# Patient Record
Sex: FEMALE | Race: WHITE | Employment: FULL TIME | URBAN - METROPOLITAN AREA
[De-identification: names, ages, dates, MRNs, and addresses within clinical notes are randomized per-mention and may not be internally consistent; named-entity substitution may affect disease eponyms.]

---

## 2020-03-04 ENCOUNTER — APPOINTMENT (OUTPATIENT)
Dept: MRI IMAGING | Facility: CLINIC | Age: 45
End: 2020-03-04
Attending: HOSPITALIST
Payer: COMMERCIAL

## 2020-03-04 ENCOUNTER — APPOINTMENT (OUTPATIENT)
Dept: CT IMAGING | Facility: CLINIC | Age: 45
End: 2020-03-04
Attending: EMERGENCY MEDICINE
Payer: COMMERCIAL

## 2020-03-04 ENCOUNTER — HOSPITAL ENCOUNTER (OUTPATIENT)
Facility: CLINIC | Age: 45
Discharge: HOME | End: 2020-03-05
Attending: EMERGENCY MEDICINE | Admitting: HOSPITALIST
Payer: COMMERCIAL

## 2020-03-04 DIAGNOSIS — I63.9 ACUTE CVA (CEREBROVASCULAR ACCIDENT) (H): ICD-10-CM

## 2020-03-04 LAB
ALBUMIN SERPL-MCNC: 4 G/DL (ref 3.4–5)
ALP SERPL-CCNC: 59 U/L (ref 40–150)
ALT SERPL W P-5'-P-CCNC: 17 U/L (ref 0–50)
ANION GAP SERPL CALCULATED.3IONS-SCNC: 6 MMOL/L (ref 3–14)
APTT PPP: 26 SEC (ref 22–37)
AST SERPL W P-5'-P-CCNC: 13 U/L (ref 0–45)
BASOPHILS # BLD AUTO: 0 10E9/L (ref 0–0.2)
BASOPHILS NFR BLD AUTO: 0.2 %
BILIRUB DIRECT SERPL-MCNC: 0.2 MG/DL (ref 0–0.2)
BILIRUB SERPL-MCNC: 1.1 MG/DL (ref 0.2–1.3)
BUN SERPL-MCNC: 6 MG/DL (ref 7–30)
CALCIUM SERPL-MCNC: 9.2 MG/DL (ref 8.5–10.1)
CHLORIDE SERPL-SCNC: 111 MMOL/L (ref 94–109)
CO2 SERPL-SCNC: 23 MMOL/L (ref 20–32)
CREAT SERPL-MCNC: 0.7 MG/DL (ref 0.52–1.04)
DIFFERENTIAL METHOD BLD: NORMAL
EOSINOPHIL # BLD AUTO: 0.1 10E9/L (ref 0–0.7)
EOSINOPHIL NFR BLD AUTO: 1.4 %
ERYTHROCYTE [DISTWIDTH] IN BLOOD BY AUTOMATED COUNT: 13 % (ref 10–15)
GFR SERPL CREATININE-BSD FRML MDRD: >90 ML/MIN/{1.73_M2}
GLUCOSE SERPL-MCNC: 107 MG/DL (ref 70–99)
HBA1C MFR BLD: 5.2 % (ref 0–5.6)
HCT VFR BLD AUTO: 40.2 % (ref 35–47)
HGB BLD-MCNC: 13.4 G/DL (ref 11.7–15.7)
IMM GRANULOCYTES # BLD: 0 10E9/L (ref 0–0.4)
IMM GRANULOCYTES NFR BLD: 0.2 %
INR PPP: 1.05 (ref 0.86–1.14)
INTERPRETATION ECG - MUSE: NORMAL
LYMPHOCYTES # BLD AUTO: 3.8 10E9/L (ref 0.8–5.3)
LYMPHOCYTES NFR BLD AUTO: 40.8 %
MCH RBC QN AUTO: 32 PG (ref 26.5–33)
MCHC RBC AUTO-ENTMCNC: 33.3 G/DL (ref 31.5–36.5)
MCV RBC AUTO: 96 FL (ref 78–100)
MONOCYTES # BLD AUTO: 0.5 10E9/L (ref 0–1.3)
MONOCYTES NFR BLD AUTO: 5.4 %
NEUTROPHILS # BLD AUTO: 4.9 10E9/L (ref 1.6–8.3)
NEUTROPHILS NFR BLD AUTO: 52 %
NRBC # BLD AUTO: 0 10*3/UL
NRBC BLD AUTO-RTO: 0 /100
PLATELET # BLD AUTO: 237 10E9/L (ref 150–450)
POTASSIUM SERPL-SCNC: 3.4 MMOL/L (ref 3.4–5.3)
PROT SERPL-MCNC: 7.4 G/DL (ref 6.8–8.8)
RBC # BLD AUTO: 4.19 10E12/L (ref 3.8–5.2)
SODIUM SERPL-SCNC: 140 MMOL/L (ref 133–144)
TROPONIN I SERPL-MCNC: <0.015 UG/L (ref 0–0.04)
WBC # BLD AUTO: 9.4 10E9/L (ref 4–11)

## 2020-03-04 PROCEDURE — 99223 1ST HOSP IP/OBS HIGH 75: CPT | Mod: AI | Performed by: HOSPITALIST

## 2020-03-04 PROCEDURE — 84484 ASSAY OF TROPONIN QUANT: CPT | Performed by: HOSPITALIST

## 2020-03-04 PROCEDURE — 70450 CT HEAD/BRAIN W/O DYE: CPT

## 2020-03-04 PROCEDURE — 83036 HEMOGLOBIN GLYCOSYLATED A1C: CPT | Performed by: EMERGENCY MEDICINE

## 2020-03-04 PROCEDURE — 85610 PROTHROMBIN TIME: CPT | Performed by: EMERGENCY MEDICINE

## 2020-03-04 PROCEDURE — 93005 ELECTROCARDIOGRAM TRACING: CPT

## 2020-03-04 PROCEDURE — 0042T CT HEAD PERFUSION WITH CONTRAST: CPT

## 2020-03-04 PROCEDURE — 25000132 ZZH RX MED GY IP 250 OP 250 PS 637: Performed by: HOSPITALIST

## 2020-03-04 PROCEDURE — 99291 CRITICAL CARE FIRST HOUR: CPT | Performed by: NURSE PRACTITIONER

## 2020-03-04 PROCEDURE — A9585 GADOBUTROL INJECTION: HCPCS | Performed by: HOSPITALIST

## 2020-03-04 PROCEDURE — 25000125 ZZHC RX 250: Performed by: EMERGENCY MEDICINE

## 2020-03-04 PROCEDURE — 25800030 ZZH RX IP 258 OP 636: Performed by: HOSPITALIST

## 2020-03-04 PROCEDURE — 84484 ASSAY OF TROPONIN QUANT: CPT | Performed by: EMERGENCY MEDICINE

## 2020-03-04 PROCEDURE — 70553 MRI BRAIN STEM W/O & W/DYE: CPT

## 2020-03-04 PROCEDURE — 85025 COMPLETE CBC W/AUTO DIFF WBC: CPT | Performed by: EMERGENCY MEDICINE

## 2020-03-04 PROCEDURE — 70498 CT ANGIOGRAPHY NECK: CPT

## 2020-03-04 PROCEDURE — 25000132 ZZH RX MED GY IP 250 OP 250 PS 637: Performed by: EMERGENCY MEDICINE

## 2020-03-04 PROCEDURE — 99285 EMERGENCY DEPT VISIT HI MDM: CPT | Mod: 25

## 2020-03-04 PROCEDURE — 85730 THROMBOPLASTIN TIME PARTIAL: CPT | Performed by: EMERGENCY MEDICINE

## 2020-03-04 PROCEDURE — 25500064 ZZH RX 255 OP 636: Performed by: HOSPITALIST

## 2020-03-04 PROCEDURE — 12000000 ZZH R&B MED SURG/OB

## 2020-03-04 PROCEDURE — 80076 HEPATIC FUNCTION PANEL: CPT | Performed by: EMERGENCY MEDICINE

## 2020-03-04 PROCEDURE — 80048 BASIC METABOLIC PNL TOTAL CA: CPT | Performed by: EMERGENCY MEDICINE

## 2020-03-04 PROCEDURE — 36415 COLL VENOUS BLD VENIPUNCTURE: CPT | Performed by: HOSPITALIST

## 2020-03-04 PROCEDURE — 25000128 H RX IP 250 OP 636: Performed by: EMERGENCY MEDICINE

## 2020-03-04 RX ORDER — ASPIRIN 325 MG
325 TABLET ORAL ONCE
Status: COMPLETED | OUTPATIENT
Start: 2020-03-04 | End: 2020-03-04

## 2020-03-04 RX ORDER — PROCHLORPERAZINE MALEATE 10 MG
10 TABLET ORAL EVERY 6 HOURS PRN
Status: DISCONTINUED | OUTPATIENT
Start: 2020-03-04 | End: 2020-03-05 | Stop reason: HOSPADM

## 2020-03-04 RX ORDER — NALOXONE HYDROCHLORIDE 0.4 MG/ML
.1-.4 INJECTION, SOLUTION INTRAMUSCULAR; INTRAVENOUS; SUBCUTANEOUS
Status: DISCONTINUED | OUTPATIENT
Start: 2020-03-04 | End: 2020-03-05 | Stop reason: HOSPADM

## 2020-03-04 RX ORDER — IOPAMIDOL 755 MG/ML
120 INJECTION, SOLUTION INTRAVASCULAR ONCE
Status: COMPLETED | OUTPATIENT
Start: 2020-03-04 | End: 2020-03-04

## 2020-03-04 RX ORDER — ACETAMINOPHEN 325 MG/1
650 TABLET ORAL EVERY 4 HOURS PRN
Status: DISCONTINUED | OUTPATIENT
Start: 2020-03-04 | End: 2020-03-05 | Stop reason: HOSPADM

## 2020-03-04 RX ORDER — ONDANSETRON 4 MG/1
4 TABLET, ORALLY DISINTEGRATING ORAL EVERY 6 HOURS PRN
Status: DISCONTINUED | OUTPATIENT
Start: 2020-03-04 | End: 2020-03-05 | Stop reason: HOSPADM

## 2020-03-04 RX ORDER — CLOPIDOGREL BISULFATE 75 MG/1
75 TABLET ORAL DAILY
Status: DISCONTINUED | OUTPATIENT
Start: 2020-03-05 | End: 2020-03-05 | Stop reason: HOSPADM

## 2020-03-04 RX ORDER — SODIUM CHLORIDE 9 MG/ML
INJECTION, SOLUTION INTRAVENOUS CONTINUOUS
Status: DISCONTINUED | OUTPATIENT
Start: 2020-03-04 | End: 2020-03-05 | Stop reason: HOSPADM

## 2020-03-04 RX ORDER — ONDANSETRON 2 MG/ML
4 INJECTION INTRAMUSCULAR; INTRAVENOUS EVERY 6 HOURS PRN
Status: DISCONTINUED | OUTPATIENT
Start: 2020-03-04 | End: 2020-03-05 | Stop reason: HOSPADM

## 2020-03-04 RX ORDER — AMOXICILLIN 250 MG
2 CAPSULE ORAL 2 TIMES DAILY PRN
Status: DISCONTINUED | OUTPATIENT
Start: 2020-03-04 | End: 2020-03-05 | Stop reason: HOSPADM

## 2020-03-04 RX ORDER — LIDOCAINE 40 MG/G
CREAM TOPICAL
Status: DISCONTINUED | OUTPATIENT
Start: 2020-03-04 | End: 2020-03-05 | Stop reason: HOSPADM

## 2020-03-04 RX ORDER — CLOPIDOGREL 300 MG/1
300 TABLET, FILM COATED ORAL ONCE
Status: COMPLETED | OUTPATIENT
Start: 2020-03-04 | End: 2020-03-04

## 2020-03-04 RX ORDER — ASPIRIN 300 MG/1
300 SUPPOSITORY RECTAL DAILY
Status: DISCONTINUED | OUTPATIENT
Start: 2020-03-05 | End: 2020-03-05

## 2020-03-04 RX ORDER — PROCHLORPERAZINE 25 MG
25 SUPPOSITORY, RECTAL RECTAL EVERY 12 HOURS PRN
Status: DISCONTINUED | OUTPATIENT
Start: 2020-03-04 | End: 2020-03-05 | Stop reason: HOSPADM

## 2020-03-04 RX ORDER — AMOXICILLIN 250 MG
1 CAPSULE ORAL 2 TIMES DAILY PRN
Status: DISCONTINUED | OUTPATIENT
Start: 2020-03-04 | End: 2020-03-05 | Stop reason: HOSPADM

## 2020-03-04 RX ORDER — GADOBUTROL 604.72 MG/ML
8 INJECTION INTRAVENOUS ONCE
Status: COMPLETED | OUTPATIENT
Start: 2020-03-04 | End: 2020-03-04

## 2020-03-04 RX ORDER — POLYETHYLENE GLYCOL 3350 17 G/17G
17 POWDER, FOR SOLUTION ORAL DAILY PRN
Status: DISCONTINUED | OUTPATIENT
Start: 2020-03-04 | End: 2020-03-05 | Stop reason: HOSPADM

## 2020-03-04 RX ORDER — ACETAMINOPHEN 650 MG/1
650 SUPPOSITORY RECTAL EVERY 4 HOURS PRN
Status: DISCONTINUED | OUTPATIENT
Start: 2020-03-04 | End: 2020-03-05 | Stop reason: HOSPADM

## 2020-03-04 RX ORDER — ATORVASTATIN CALCIUM 40 MG/1
40 TABLET, FILM COATED ORAL
Status: DISCONTINUED | OUTPATIENT
Start: 2020-03-04 | End: 2020-03-05 | Stop reason: HOSPADM

## 2020-03-04 RX ORDER — HYDRALAZINE HYDROCHLORIDE 20 MG/ML
10-20 INJECTION INTRAMUSCULAR; INTRAVENOUS
Status: DISCONTINUED | OUTPATIENT
Start: 2020-03-04 | End: 2020-03-05 | Stop reason: HOSPADM

## 2020-03-04 RX ORDER — LABETALOL HYDROCHLORIDE 5 MG/ML
10-40 INJECTION, SOLUTION INTRAVENOUS EVERY 10 MIN PRN
Status: DISCONTINUED | OUTPATIENT
Start: 2020-03-04 | End: 2020-03-05 | Stop reason: HOSPADM

## 2020-03-04 RX ADMIN — IOPAMIDOL 120 ML: 755 INJECTION, SOLUTION INTRAVENOUS at 11:49

## 2020-03-04 RX ADMIN — ATORVASTATIN CALCIUM 40 MG: 40 TABLET, FILM COATED ORAL at 22:42

## 2020-03-04 RX ADMIN — SODIUM CHLORIDE: 9 INJECTION, SOLUTION INTRAVENOUS at 20:18

## 2020-03-04 RX ADMIN — GADOBUTROL 8 ML: 604.72 INJECTION INTRAVENOUS at 21:03

## 2020-03-04 RX ADMIN — SODIUM CHLORIDE 100 ML: 9 INJECTION, SOLUTION INTRAVENOUS at 11:50

## 2020-03-04 RX ADMIN — CLOPIDOGREL BISULFATE 300 MG: 300 TABLET, FILM COATED ORAL at 12:43

## 2020-03-04 RX ADMIN — SODIUM CHLORIDE: 9 INJECTION, SOLUTION INTRAVENOUS at 14:07

## 2020-03-04 RX ADMIN — ACETAMINOPHEN 650 MG: 325 TABLET, FILM COATED ORAL at 22:40

## 2020-03-04 RX ADMIN — ASPIRIN 325 MG ORAL TABLET 325 MG: 325 PILL ORAL at 12:43

## 2020-03-04 ASSESSMENT — ENCOUNTER SYMPTOMS
SPEECH DIFFICULTY: 1
CONFUSION: 1
WEAKNESS: 1
SHORTNESS OF BREATH: 0
NECK PAIN: 1
FACIAL ASYMMETRY: 1
FATIGUE: 1
HEADACHES: 1
NUMBNESS: 1

## 2020-03-04 ASSESSMENT — ACTIVITIES OF DAILY LIVING (ADL)
ADLS_ACUITY_SCORE: 15
ADLS_ACUITY_SCORE: 16

## 2020-03-04 NOTE — H&P
[facility]    History and Physical - Hospitalist Service       Date of Admission:  3/4/2020    Assessment & Plan   America Hartley is a 44 year old female admitted on 3/4/2020.  Past medical history of nephrolithiasis and tobacco abuse who presents with dysarthria, left facial droop and left upper and lower extremity numbness.    Possible acute CVA  Presents with acute onset dysarthria, left facial droop and left upper and lower extremity numbness.  Admission CT/CTA/CTP all negative for acute pathology.  EKG without ischemic changes, troponin negative and she is without chest pain complaints.  Denies any history of migraine and was without headache currently.  BMP and CBC also unremarkable.  Symptoms improved by time of arrival to ED but reports waxing/waning LUE and LLE numbness.  She was evaluated by neurology in the emergency room who recommended MRI of the brain and loading with aspirin and Plavix.  We will continue aspirin and Plavix daily, obtain MRI as noted, obtain echocardiogram, serial troponin, fasting lipid panel, hemoglobin A1c.  Will monitor on telemetry.  Will ask SLP, PT and OT to evaluate.    Tobacco abuse  Smokes 1/2 pack/day and has done so for the past 30 years.  She declines any kidney patch.       Diet: Regular diet once cleared for oral  DVT Prophylaxis: Pneumatic Compression Devices  Morrell Catheter: not present  Code Status: Full code per patient    Disposition Plan   Expected discharge: 2 - 3 days, recommended to prior living arrangement once work-up complete.  Entered: Walt Ayala MD 03/04/2020, 1:14 PM     The patient's care was discussed with the Patient and Patient's Family.    Walt Ayala MD  [facility]    ______________________________________________________________________    Chief Complaint   Dysarthria, left-sided numbness    History is obtained from the patient, chart review, discussion with emergency room provider in addition to discussion with  neurology team    History of Present Illness   America Hartley is a 44 year old female who presents with acute onset dysarthria and left upper and lower extremity numbness approximately 10:30 AM this morning.  She reports she was at work when she suddenly started to feel abnormal and noted that her left arm was numb.  She attempted to notify some of her colleagues, but she had very garbled speech which was unintelligible.  She then had onset of left lower extremity numbness as well.  She reports in the ambulance she had left-sided posterior neck pain but this resolved by time of arrival to the emergency room.  She reports her symptoms started to improve by time of arrival as well.  Her coworkers told her that she did have a left facial droop.  She denies any prior history of similar symptoms.  Denies any history of migraine headache and denies any current headache.  She reports her symptoms have nearly resolved but has ongoing waxing and waning of numbness in the left arm.  She otherwise only reports generalized fatigue.  She denies any chest pain or pressure, palpitations or history of palpitations.  She denies any shortness of breath, diaphoresis, nausea.  She reports having flulike symptoms approximately 3 to 4 weeks ago but has otherwise been in her usual state of health.  She denies any history of hypertension, hyperlipidemia, diabetes denies any personal or family history of coronary disease or clots.  Remainder of review of systems otherwise negative.    Review of Systems    The 10 point Review of Systems is negative other than noted in the HPI or here.     Past Medical History    Nephrolithiasis  Diverticulosis  Tobacco dependence    Past Surgical History   Appendectomy  Cholecystectomy    Social History   She has a 15-pack-year history of tobacco abuse, currently smoking 1/2 pack/day.  She reports approximately 2 alcoholic beverages per week.  Denies any illicit drug use.  Resides at home with her  .    Family History   Denies any family history of coronary disease or stroke, no family history of clotting disorder.    Prior to Admission Medications   Prior to Admission Medications   Prescriptions Last Dose Informant Patient Reported? Taking?   methylPREDNISolone Acetate (DEPO-MEDROL IJ) 1/27/2020  Yes Yes   Sig: Inject as directed every 3 months      Facility-Administered Medications: None     Allergies   No Known Allergies    Physical Exam   Vital Signs: Temp: 98.4  F (36.9  C) Temp src: Oral BP: 124/80 Pulse: 72 Heart Rate: 76 Resp: 18 SpO2: 98 %      Weight: 185 lbs 6.51 oz    General Appearance: Well-nourished female in no acute distress  Eyes: Pupils equal, round and reactive to light, sclera anicteric  HEENT: Mucous membranes moist, no neck lymphadenopathy  Respiratory: Lungs clear to auscultation bilaterally, no wheeze or crackles, no tachypnea  Cardiovascular: Regular in rhythm, normal S1/S2, no murmurs, rubs or gallops  GI: Abdomen soft, nontender, nondistended, normal bowel sounds  Lymph/Hematologic: No peripheral edema  Musculoskeletal: Extremities warm well perfused  Neurologic: Cranial nerves II through XII intact, 5 of 5 strength in all extremities, sensation to light touch intact, no pronator drift, coordination intact, oriented x3  Psychiatric: Normal affect    Data   Data reviewed today: I reviewed all medications, new labs and imaging results over the last 24 hours. I personally reviewed the EKG tracing showing Normal sinus rhythm without ischemic changes.    Recent Labs   Lab 03/04/20  1136   WBC 9.4   HGB 13.4   MCV 96      INR 1.05      POTASSIUM 3.4   CHLORIDE 111*   CO2 23   BUN 6*   CR 0.70   ANIONGAP 6   KAILEE 9.2   *   TROPI <0.015     Recent Results (from the past 24 hour(s))   CT Head w/o Contrast    Narrative    CT SCAN OF THE HEAD WITHOUT CONTRAST   3/4/2020 11:47 AM     HISTORY: Code stroke.    TECHNIQUE: Axial images of the head and coronal reformations  without  IV contrast material. Radiation dose for this scan was reduced using  automated exposure control, adjustment of the mA and/or kV according  to patient size, or iterative reconstruction technique.    COMPARISON: None.    FINDINGS: Mild volume loss is present. White matter hypoattenuation  likely represents mild chronic small vessel ischemic change. The  cerebral hemispheres, brainstem, and cerebellum otherwise demonstrate  normal morphology and attenuation. No evidence of acute ischemia,  hemorrhage, mass, mass effect or hydrocephalus. The visualized  calvarium, tympanic cavities, mastoid cavities, and extracranial soft  are unremarkable. Moderate right frontal sinus mucosal thickening is  present with aerated secretions.      Impression    IMPRESSION: No evidence of acute ischemia or hemorrhage (ASPECTS 10).    MIRTHA MAKI MD   CTA Head Neck with Contrast    Narrative    CT ANGIOGRAM OF THE HEAD AND NECK WITH CONTRAST  3/4/2020 11:58 AM     HISTORY: Code stroke     TECHNIQUE: CT angiography with an injection of 70 mL Isovue-370 IV  with scans through the head and neck. Images were transferred to a  separate 3-D workstation where multiplanar reformations and 3-D images  were created. Estimates of carotid stenoses are made relative to the  distal internal carotid artery diameters except as noted. Radiation  dose for this scan was reduced using automated exposure control,  adjustment of the mA and/or kV according to patient size, or iterative  reconstruction technique.    COMPARISON: None.     CT ANGIOGRAM HEAD FINDINGS: The vertebral arteries, basilar artery,  and posterior cerebral arteries are patent. Vertebrobasilar system is  small with fetal origin of the bilateral posterior cerebral arteries.  The internal carotid arteries, anterior cerebral arteries, and middle  cerebral arteries are patent. The bilateral middle cerebral artery M1  segments appear slightly small in caliber. No evidence of large  vessel  occlusion. No evidence of aneurysm or vascular malformation. Dural  venous sinuses are unremarkable.     CT ANGIOGRAM NECK FINDINGS: A four vessel aortic arch is present with  origin of the left vertebral artery directly from the aortic arch. The  bilateral common carotid, internal carotid, external carotid, and  vertebral arteries are patent. No evidence of dissection, occlusion,  or flow-limiting stenosis. The thyroid gland is mildly enlarged and  heterogeneous in attenuation. [place] tonsillar tissue appears mildly  enlarged, likely reactive.       Impression    IMPRESSION:   CTA Head:   1. No evidence of large vessel occlusion or high-grade stenosis.  CTA Neck:   1. No evidence of dissection or flow-limiting stenosis.     MIRTHA MAKI MD   CT Head Perfusion w Contrast    Narrative    CT BRAIN PERFUSION 3/4/2020 12:06 PM    HISTORY: Stroke code.    TECHNIQUE: Time sequential axial CT images of the head were acquired  during the administration of 50 mL Isovue-370 IV. Color perfusion maps  of the brain were created from this time sequential axial source data.      Radiation dose for this scan was reduced using automated exposure  control, adjustment of the mA and/or kV according to patient size, or  iterative reconstruction technique.    COMPARISON: None.    FINDINGS: Cerebral blood flow, transit time, and volume maps  demonstrate symmetric perfusion bilaterally without evidence of focal  perfusion defect.      Impression    IMPRESSION: Normal perfusion CT scan of the brain.    MIRTHA MAKI MD

## 2020-03-04 NOTE — PLAN OF CARE
SLP: orders received/aknowledged. Per RN speech has improved, passed RN dysphagia screen though they are maintaining NPO status until MRI completed. Will check status tomorrow and see if full SLP evaluation(s) are indicated.

## 2020-03-04 NOTE — ED NOTES
"[facility]  ED Nurse Handoff Report    ED Chief complaint: Cerebrovascular Accident      ED Diagnosis:   Final diagnoses:   None       Code Status: Full Code    Allergies: No Known Allergies    Patient Story: Patient was working a cafeteria and at around 1040, walking out holding something and felt her left side go numb and states \"it wasn't working\", also stated she had trouble speaking.   Focused Assessment:  AOx3. AVSS. LUE numbness that waxes and wanes. Other neuros intact, initially patient complained of a HA and neck pain but that has also resolved. LS clear, on RA. Tele SR with PACs.     Treatments and/or interventions provided: code stroke, aspirin and plavix  Patient's response to treatments and/or interventions: n/a    To be done/followed up on inpatient unit:  continue to monitor    Does this patient have any cognitive concerns?: none    Activity level - Baseline/Home:  Independent  Activity Level - Current:   Independent    Patient's Preferred language: English   Needed?: No    Isolation: None  Infection: Not Applicable  Bariatric?: No    Vital Signs:   Vitals:    03/04/20 1213 03/04/20 1215 03/04/20 1230 03/04/20 1231   BP: 129/85 138/84 124/80    Pulse: 76 80 72    Resp: 15 11 18    Temp:    98.4  F (36.9  C)   TempSrc:    Oral   SpO2: 99% 98% 98%    Weight:           Cardiac Rhythm: SR with PACs    Was the PSS-3 completed:   Yes  What interventions are required if any?  none             Family Comments: , Santos at bedside  OBS brochure/video discussed/provided to patient/family: N/A              Name of person given brochure if not patient: n/a              Relationship to patient: n/a    For the majority of the shift this patient's behavior was Green.   Behavioral interventions performed were frequent rounding.    ED NURSE PHONE NUMBER: *[phone]         "

## 2020-03-04 NOTE — ED TRIAGE NOTES
"Patient was working in a school cafeteria, was carrying something out of the kitchen and felt her left side was numb and \"wasn't working\".   "

## 2020-03-04 NOTE — PROGRESS NOTES
RECEIVING UNIT ED HANDOFF REVIEW    ED Nurse Handoff Report was reviewed by: Wang Diana, RN on March 4, 2020 at 1:29 PM

## 2020-03-04 NOTE — ED NOTES
Bed: ST03  Expected date:   Expected time:   Means of arrival:   Comments:  lico - 514 - 44 F stroke alert eta 1131

## 2020-03-04 NOTE — ED PROVIDER NOTES
"  History     Chief Complaint:  Cerebrovascular Accident      HPI   America Hartley is a 44 year old female who presents via EMS with a possible cerebrovascular accident. The patient says that she was feeling normal this morning, but while she was at work she was carrying something and started to feel her left side go numb and tingly and \"wasn't working\" properly. EMS was called around 1042. EMS says that other coworkers noted that the patient also had slurred speech and facial drooping. EMS also notes that the patient was having difficulty with memory recall. The patient endorses a headache, neck pain, fatigue, and generalized weakness currently. The patient denies any chest pain or shortness of breath.  She has never had these symptoms before.  Her only remaining symptom is numbness in the left arm and left leg in addition to posterior neck pain and headache.  EMS reports that her blood pressure and blood sugar were both normal.    Allergies:  No Known Drug Allergies    Medications:    Medications reviewed. No current medications.     Past Medical History:    Kidney stones     Past Surgical History:    Surgical history reviewed. No pertinent surgical history.    Family History:    Family history reviewed. No pertinent family history.     Social History:  The patient presents via EMS.        Review of Systems   Constitutional: Positive for fatigue.   Respiratory: Negative for shortness of breath.    Cardiovascular: Negative for chest pain.   Musculoskeletal: Positive for neck pain.   Neurological: Positive for facial asymmetry, speech difficulty, weakness, numbness and headaches.   Psychiatric/Behavioral: Positive for confusion.   All other systems reviewed and are negative.        Physical Exam     Patient Vitals for the past 24 hrs:   BP Temp Temp src Pulse Heart Rate Resp SpO2 Weight   03/04/20 1231 -- 98.4  F (36.9  C) Oral -- -- -- -- --   03/04/20 1230 124/80 -- -- 72 76 18 98 % --   03/04/20 1215 138/84 -- -- 80 " 75 11 98 % --   03/04/20 1213 129/85 -- -- 76 82 15 99 % --   03/04/20 1201 119/77 -- -- -- 92 16 -- --   03/04/20 1200 119/77 -- -- 88 94 16 -- --   03/04/20 1136 -- -- -- -- 93 12 -- --   03/04/20 1135 (!) 141/92 -- -- 96 93 -- -- 84.1 kg (185 lb 6.5 oz)        Physical Exam    Nursing note and vitals reviewed.  Constitutional:  Oriented to person, place, and time. Cooperative.   HENT:   Nose:    Nose normal.   Mouth/Throat:   Mucous membranes are normal.   Eyes:    Conjunctivae normal and EOM are normal.      Pupils are equal, round, and reactive to light.   Neck:    Trachea normal.   Cardiovascular:  Normal rate, regular rhythm, normal heart sounds and normal pulses. No murmur heard.  Pulmonary/Chest:  Effort normal and breath sounds normal.   Abdominal:   Soft. Normal appearance and bowel sounds are normal.      There is no tenderness.      There is no rebound and no CVA tenderness.   Musculoskeletal:  Extremities atraumatic x 4.   Lymphadenopathy:  No cervical adenopathy.   Neurological:   Alert and oriented to person, place, and time. Normal strength.      No cranial nerve deficit.  Subjective decreased sensation to light touch in the left arm and left leg but sensation intact elsewhere.  No pronator drift and no leg drift.  Visual fields are intact.  GCS eye subscore is 4. GCS verbal subscore is 5. GCS motor subscore is 6.   Skin:    Skin is intact. No rash noted.   Psychiatric:   Somewhat anxious but otherwise normal mood and affect.      Emergency Department Course     ECG:  ECG taken at 1201, ECG read at 1204  Normal sinus rhythm  Normal ECG  Rate 88 bpm. MS interval 144 ms. QRS duration 90 ms. QT/QTc 380/459 ms. P-R-T axes 65 55 41.    Imaging:  Radiology findings were communicated with the patient and  who voiced understanding of the findings.    CT Head Perfusion w Contrast  Normal perfusion CT scan of the brain.  Reading per radiology    CTA Head Neck with Contrast  CTA Head:   1. No evidence  of large vessel occlusion or high-grade stenosis.  2. Bilateral middle cerebral artery M1 segments appear slightly small  in caliber, presumably incidental.   CTA Neck:   1. No evidence of dissection or flow-limiting stenosis.   Reading per radiology    CT Head w/o Contrast  No evidence of acute ischemia or hemorrhage (ASPECTS 10).  MIRTHA MAKI MD  Reading per radiology    Laboratory:  Laboratory findings were communicated with the patient and  who voiced understanding of the findings.    CBC: WBC 9.4, HGB 13.4,   BMP: Chloride 111 (H),  (H), BUN 6 (L) o/w WNL (Creatinine 0.70)  INR: 1.05  PTT: 26  Troponin (Collected 1136): <0.015     Interventions:  Plavix 300 mg Oral  Aspirin 325 mg Oral     Emergency Department Course:    1131 Nursing notes and vitals reviewed. I performed an exam of the patient as documented above.     1135 Code Stroke called. IV was inserted and blood was drawn for laboratory testing, results above.     1139 I spoke with Dr. Champagne of the stroke neurology service regarding patient's presentation, findings, and plan of care.     1141 The patient was sent for a CT/CTA while in the emergency department, results above.      1233 I spoke with Dr. Ayala of the hospitalist service regarding patient's presentation, findings, and plan of care.      The patient is admitted into the care of Dr. Ayala.     Impression & Plan      CMS Diagnoses: The patient has stroke symptoms:           ED Stroke specific documentation           NIHSS PDF          Protocol PDF     Patient last known well time: 1030  ED Provider first to bedside at: 1133  CT Results received at: 1150    tPA:   Not given due to minor / isolated / quickly resolving stroke symptoms.    If treating with tPA: Ensure SBP<185 and DBP<105 prior to treatment with IV tPA.  Administering IV tPA after treatment with IV labetalol, hydralazine, or nicardipine is reasonable once BP control is established.    Endovascular  Retrieval:  Not initiated due to absence of proximal vessel occlusion    National Institutes of Health Stroke Scale (Baseline)  Time Performed: 1133      Score    Level of consciousness: (0)   Alert, keenly responsive    LOC questions: (0)   Answers both questions correctly    LOC commands: (0)   Performs both tasks correctly    Best gaze: (0)   Normal    Visual: (0)   No visual loss    Facial palsy: (0)   Normal symmetrical movements    Motor arm (left): (0)   No drift    Motor arm (right): (0)   No drift    Motor leg (left): (0)   No drift    Motor leg (right): (0)   No drift    Limb ataxia: (0)   Absent    Sensory: (1)   Mild to moderate sensory loss    Best language: (0)   Normal- no aphasia    Dysarthria: (0)   Normal    Extinction and inattention: (0)   No abnormality        Total Score:  1        Stroke Mimics were considered (including migraine headache, seizure disorder, hypoglycemia (or hyperglycemia), head or spinal trauma, CNS infection, Toxin ingestion and shock state (e.g. sepsis) .      Medical Decision Making:  America Hartley is a 44 year old female who presents to the emergency department today for evaluation of a possible stroke.  Even though she had minimal symptoms remaining upon arrival here and minimal exam findings, I felt it was best to activate a code stroke.  I then spoke with the stroke neurologist, Dr. Champagne, and he and his team came and saw the patient right away.  Her CT scans do not show anything acute which is reassuring.  The stroke team recommended loading the patient with oral aspirin and Plavix and bringing her into the hospital for further evaluation and management.  Therefore I then spoke with Dr. Ayala, who will be admitting the patient.      Diagnosis:    ICD-10-CM    1. Acute CVA (cerebrovascular accident) (H) I63.9         Disposition:   Findings and plan explained to the Patient who consents to admission. Discussed the patient with Dr. Ayala, who will admit the patient to  a bed for further monitoring, evaluation, and treatment.       Scribe Disclosure:  I, Simon Esparza, am serving as a scribe at 11:35 AM on 3/4/2020 to document services personally performed by Andrey Hyde MD based on my observations and the provider's statements to me.       EMERGENCY DEPARTMENT       Andrey Hyde MD  03/04/20 [id number]

## 2020-03-05 ENCOUNTER — APPOINTMENT (OUTPATIENT)
Dept: CARDIOLOGY | Facility: CLINIC | Age: 45
End: 2020-03-05
Attending: HOSPITALIST
Payer: COMMERCIAL

## 2020-03-05 ENCOUNTER — APPOINTMENT (OUTPATIENT)
Dept: OCCUPATIONAL THERAPY | Facility: CLINIC | Age: 45
End: 2020-03-05
Attending: HOSPITALIST
Payer: COMMERCIAL

## 2020-03-05 VITALS
TEMPERATURE: 98.5 F | HEART RATE: 76 BPM | DIASTOLIC BLOOD PRESSURE: 74 MMHG | WEIGHT: 185.41 LBS | SYSTOLIC BLOOD PRESSURE: 125 MMHG | RESPIRATION RATE: 16 BRPM | OXYGEN SATURATION: 97 %

## 2020-03-05 PROBLEM — G45.9 TIA (TRANSIENT ISCHEMIC ATTACK): Status: ACTIVE | Noted: 2020-03-05

## 2020-03-05 LAB
ANION GAP SERPL CALCULATED.3IONS-SCNC: 3 MMOL/L (ref 3–14)
BUN SERPL-MCNC: 7 MG/DL (ref 7–30)
CALCIUM SERPL-MCNC: 8.7 MG/DL (ref 8.5–10.1)
CHLORIDE SERPL-SCNC: 116 MMOL/L (ref 94–109)
CHOLEST SERPL-MCNC: 163 MG/DL
CO2 SERPL-SCNC: 21 MMOL/L (ref 20–32)
CREAT SERPL-MCNC: 0.72 MG/DL (ref 0.52–1.04)
ERYTHROCYTE [DISTWIDTH] IN BLOOD BY AUTOMATED COUNT: 12.8 % (ref 10–15)
GFR SERPL CREATININE-BSD FRML MDRD: >90 ML/MIN/{1.73_M2}
GLUCOSE SERPL-MCNC: 89 MG/DL (ref 70–99)
HCT VFR BLD AUTO: 39.4 % (ref 35–47)
HDLC SERPL-MCNC: 37 MG/DL
HGB BLD-MCNC: 12.8 G/DL (ref 11.7–15.7)
LDLC SERPL CALC-MCNC: 113 MG/DL
MCH RBC QN AUTO: 31.4 PG (ref 26.5–33)
MCHC RBC AUTO-ENTMCNC: 32.5 G/DL (ref 31.5–36.5)
MCV RBC AUTO: 97 FL (ref 78–100)
NONHDLC SERPL-MCNC: 126 MG/DL
PLATELET # BLD AUTO: 221 10E9/L (ref 150–450)
POTASSIUM SERPL-SCNC: 4.1 MMOL/L (ref 3.4–5.3)
RBC # BLD AUTO: 4.07 10E12/L (ref 3.8–5.2)
SODIUM SERPL-SCNC: 140 MMOL/L (ref 133–144)
TRIGL SERPL-MCNC: 67 MG/DL
WBC # BLD AUTO: 8.6 10E9/L (ref 4–11)

## 2020-03-05 PROCEDURE — 85027 COMPLETE CBC AUTOMATED: CPT | Performed by: HOSPITALIST

## 2020-03-05 PROCEDURE — 99407 BEHAV CHNG SMOKING > 10 MIN: CPT

## 2020-03-05 PROCEDURE — G0378 HOSPITAL OBSERVATION PER HR: HCPCS

## 2020-03-05 PROCEDURE — 97165 OT EVAL LOW COMPLEX 30 MIN: CPT | Mod: GO

## 2020-03-05 PROCEDURE — 99217 ZZC OBSERVATION CARE DISCHARGE: CPT | Performed by: HOSPITALIST

## 2020-03-05 PROCEDURE — 80061 LIPID PANEL: CPT | Performed by: HOSPITALIST

## 2020-03-05 PROCEDURE — 97530 THERAPEUTIC ACTIVITIES: CPT | Mod: GO

## 2020-03-05 PROCEDURE — 93306 TTE W/DOPPLER COMPLETE: CPT | Mod: 26 | Performed by: INTERNAL MEDICINE

## 2020-03-05 PROCEDURE — 93306 TTE W/DOPPLER COMPLETE: CPT

## 2020-03-05 PROCEDURE — 36415 COLL VENOUS BLD VENIPUNCTURE: CPT | Performed by: HOSPITALIST

## 2020-03-05 PROCEDURE — 25000132 ZZH RX MED GY IP 250 OP 250 PS 637: Performed by: HOSPITALIST

## 2020-03-05 PROCEDURE — 80048 BASIC METABOLIC PNL TOTAL CA: CPT | Performed by: HOSPITALIST

## 2020-03-05 RX ORDER — ATORVASTATIN CALCIUM 40 MG/1
40 TABLET, FILM COATED ORAL DAILY
Qty: 30 TABLET | Refills: 0 | Status: SHIPPED | OUTPATIENT
Start: 2020-03-05

## 2020-03-05 RX ORDER — CLOPIDOGREL BISULFATE 75 MG/1
75 TABLET ORAL DAILY
Qty: 20 TABLET | Refills: 0 | Status: SHIPPED | OUTPATIENT
Start: 2020-03-06

## 2020-03-05 RX ORDER — ASPIRIN 81 MG/1
81 TABLET ORAL DAILY
Status: DISCONTINUED | OUTPATIENT
Start: 2020-03-06 | End: 2020-03-05 | Stop reason: HOSPADM

## 2020-03-05 RX ADMIN — ASPIRIN 325 MG: 325 TABLET, COATED ORAL at 08:45

## 2020-03-05 RX ADMIN — CLOPIDOGREL BISULFATE 75 MG: 75 TABLET, FILM COATED ORAL at 08:45

## 2020-03-05 ASSESSMENT — ACTIVITIES OF DAILY LIVING (ADL)
ADLS_ACUITY_SCORE: 8
PREVIOUS_RESPONSIBILITIES: MEAL PREP;HOUSEKEEPING;LAUNDRY;SHOPPING;YARDWORK;MEDICATION MANAGEMENT;FINANCES;DRIVING;WORK
ADLS_ACUITY_SCORE: 8
ADLS_ACUITY_SCORE: 8
ADLS_ACUITY_SCORE: 13

## 2020-03-05 NOTE — PROGRESS NOTES
03/05/20 0916   Quick Adds   Type of Visit Initial Occupational Therapy Evaluation   Living Environment   Lives With spouse   Living Arrangements house   Home Accessibility stairs to enter home   Number of Stairs, Main Entrance 2   Living Environment Comment main level living   Self-Care   Usual Activity Tolerance excellent   Current Activity Tolerance excellent   Functional Level   Ambulation 0-->independent   Transferring 0-->independent   Toileting 0-->independent   Bathing 0-->independent   Dressing 0-->independent   Eating 0-->independent   Communication 0-->understands/communicates without difficulty   Fall history within last six months no   General Information   Onset of Illness/Injury or Date of Surgery - Date 03/04/20   Referring Physician Walt Ayala MD   Patient/Family Goals Statement Return home   Additional Occupational Profile Info/Pertinent History of Current Problem America Hartley is a 44 year old female with past medical history significant for tobacco use who presented to the [facility] ED for evaluation of acute onset left arm numbness, dysarthria, and facial droop. She reports that she was at work unloading boxes when she noticed that she was having difficulty maneuvering her left arm. Coworkers stated that her words were thick and garbled. She denies vision changes or focal weakness.    Precautions/Limitations fall precautions   Cognitive Status Examination   Orientation orientation to person, place and time   Level of Consciousness alert   Follows Commands (Cognition) follows multi-step commands   Memory intact   Attention No deficits were identified   Organization/Problem Solving No deficits were identified   Visual Perception   Visual Perception   (reading glasses)   Visual Field intact   Visual Perception Comments reports blury vision is greatly improved from yesterday. slight difficulty with vertical saccades   Sensory Examination   Sensory Quick Adds No deficits were identified   Pain  "Assessment   Patient Currently in Pain No   Hand Strength   Left hand  (pounds) 56 pounds   Right hand  (pounds) 71 pounds   Coordination   Upper Extremity Coordination No deficits were identified   Transfer Skill: Bed to Chair/Chair to Bed   Level of [place]: Bed to Chair independent   Transfer Skill: Sit to Stand   Level of [place]: Sit/Stand independent   Transfer Skill: Toilet Transfer   Level of [place]: Toilet independent   Upper Body Dressing   Level of [place]: Dress Upper Body independent   Toileting   Level of [place]: Toilet independent   Grooming   Level of [place]: Grooming independent   Eating/Self Feeding   Level of [place]: Eating independent   Instrumental Activities of Daily Living (IADL)   Previous Responsibilities meal prep;housekeeping;laundry;shopping;yardwork;medication management;finances;driving;work   IADL Comments works in school cafeteria   Activities of Daily Living Analysis   Impairments Contributing to Impaired Activities of Daily Living strength decreased;cognition impaired   General Therapy Interventions   Planned Therapy Interventions neuromuscular re-education;cognition   Clinical Impression   Criteria for Skilled Therapeutic Interventions Met yes, treatment indicated   OT Diagnosis decline function   Influenced by the following impairments LUE gross grasp impairment and mild cognitive impairment   Assessment of Occupational Performance 1-3 Performance Deficits   Identified Performance Deficits IADLs   Clinical Decision Making (Complexity) Low complexity   Therapy Frequency Daily   Predicted Duration of Therapy Intervention (days/wks) 1 day   Anticipated Equipment Needs at Discharge   (none)   Anticipated Discharge Disposition Home   Risks and Benefits of Treatment have been explained. Yes   Patient, Family & other staff in agreement with plan of care Yes   [facility] AM-PAC TM \"6 Clicks\"   2016, Trustees of [facility], under " "license to [employer].  All rights reserved.   6 Clicks Short Forms Daily Activity Inpatient Short Form   [facility] AM-PAC  \"6 Clicks\" Daily Activity Inpatient Short Form   1. Putting on and taking off regular lower body clothing? 4 - None   2. Bathing (including washing, rinsing, drying)? 4 - None   3. Toileting, which includes using toilet, bedpan or urinal? 4 - None   4. Putting on and taking off regular upper body clothing? 4 - None   5. Taking care of personal grooming such as brushing teeth? 4 - None   6. Eating meals? 4 - None   Daily Activity Raw Score (Score out of 24.Lower scores equate to lower levels of function) 24   Total Evaluation Time   Total Evaluation Time (Minutes) 20     "

## 2020-03-05 NOTE — CONSULTS
"  [facility]    Stroke Consult Note    Reason for Consult: Stroke code    Chief Complaint: Cerebrovascular Accident      HPI  America Hartley is a 44 year old female with past medical history significant for tobacco use who presented to the [facility] ED for evaluation of acute onset left arm numbness, dysarthria, and facial droop. She reports that she was at work unloading boxes when she noticed that she was having difficulty maneuvering her left arm. Coworkers stated that her words were thick and garbled. She denies vision changes or focal weakness.     CTH negative. CTA head/neck without LVO. CTP normal.     TPA Treatment   Not given due to minor / isolated / quickly resolving stroke symptoms.    Endovascular Treatment  Not initiated due to absence of proximal vessel occlusion    Impression  Ischemic Stroke due to small-vessel occlusion     Recommendations  Acute Ischemic Stroke (without tPA) Recommendations  - Neurochecks Q 4 hours  - Permissive HTN; labetalol PRN for SBP > 220  - Daily aspirin 81 mg for secondary stroke prevention  - Plavix (clopidogrel) 300 mg PO loading dose x 1  - Plavix (clopidogrel) 75 mg PO Daily  - Statin: pending LDL  - MRI Stroke Protocol  - TTE with Bubble Study  - Telemetry, EKG  - A1c, Lipid Panel, Troponin x 3  - PT/OT/SLP  - Stroke Class per [facility] ([facility])  - Smoking Cessation  - Euthermia, Euglycemia    Patient Follow-up    - final recommendation pending work-up    Thank you for this consult. We will continue to follow.     SOHA Evans, CNP  Neurology  03/04/2020 8:11 PM  To page stroke neurology after hours or on a subsequent day, click here: AMCOM  Choose \"On Call\" tab at top, then search dropdown box for \"Neurology Adult\" & press Enter, look for Neuro ICU/Stroke  ______________________________________________________    Past Medical History   No past medical history on file.  Past Surgical History   No past surgical history on " file.  Medications   Home Meds  Prior to Admission medications    Medication Sig Start Date End Date Taking? Authorizing Provider   methylPREDNISolone Acetate (DEPO-MEDROL IJ) Inject as directed every 3 months   Yes Unknown, Entered By History       Scheduled Meds    [START ON 3/5/2020] aspirin  325 mg Oral Daily    Or     [START ON 3/5/2020] aspirin  300 mg Rectal Daily     atorvastatin  40 mg Oral or NG Tube Daily at 8 pm     [START ON 3/5/2020] clopidogrel  75 mg Oral or NG Tube Daily     sodium chloride (PF)  3 mL Intracatheter Q8H       Infusion Meds    - MEDICATION INSTRUCTIONS -       sodium chloride 100 mL/hr at 03/04/20 1407       PRN Meds  acetaminophen, acetaminophen, hydrALAZINE, labetalol, lidocaine 4%, lidocaine (buffered or not buffered), - MEDICATION INSTRUCTIONS -, melatonin, naloxone, ondansetron **OR** ondansetron, polyethylene glycol, prochlorperazine **OR** prochlorperazine **OR** prochlorperazine, senna-docusate **OR** senna-docusate, sodium chloride (PF)    Allergies   No Known Allergies  Family History   No family history on file.  Social History   Social History     Tobacco Use     Smoking status: Not on file   Substance Use Topics     Alcohol use: Not on file     Drug use: Not on file       Review of Systems   The 10 point Review of Systems is negative other than noted in the HPI or here.        PHYSICAL EXAMINATION  Temp:  [98.1  F (36.7  C)-98.4  F (36.9  C)] 98.1  F (36.7  C)  Pulse:  [69-96] 76  Heart Rate:  [69-94] 78  Resp:  [11-18] 16  BP: (108-141)/(71-92) 108/71  SpO2:  [95 %-99 %] 95 %     Neurologic  Mental Status:  alert, oriented x 3, follows commands, speech clear and fluent, naming and repetition normal  Cranial Nerves:  visual fields intact, PERRL, EOMI with normal smooth pursuit, facial sensation intact and symmetric, facial movements symmetric, hearing not formally tested but intact to conversation, palate elevation symmetric and uvula midline, no dysarthria, shoulder  shrug strong bilaterally, tongue protrusion midline  Motor:  normal muscle tone and bulk, no abnormal movements, able to move all limbs spontaneously, subtle LUE drift, all other extremities without drift   Reflexes:  not assessed  Sensory:  no extinction on double simultaneous stimulation , decreased sensation to LUE  Coordination:  normal finger-to-nose and heel-to-shin bilaterally without dysmetria  Station/Gait:  deferred      Dysphagia Screen  Per Nursing    Stroke Scales    NIHSS  Interval     Interval Comments     1a. Level of Consciousness 0-->Alert, keenly responsive   1b. LOC Questions 0-->Answers both questions correctly   1c. LOC Commands 0-->Performs both tasks correctly   2.   Best Gaze 0-->Normal   3.   Visual 0-->No visual loss   4.   Facial Palsy 0-->Normal symmetrical movements   5a. Motor Arm, Left 1-->Drift, limb holds 90 (or 45) degrees, but drifts down before full 10 seconds, does not hit bed or other support   5b. Motor Arm, Right 0-->No drift, limb holds 90 (or 45) degrees for full 10 secs   6a. Motor Leg, Left 0-->No drift, leg holds 30 degree position for full 5 secs   6b. Motor Leg, right 0-->No drift, leg holds 30 degree position for full 5 secs   7.   Limb Ataxia 0-->Absent   8.   Sensory 1-->Mild-to-moderate sensory loss, patient feels pinprick is less sharp or is dull on the affected side, or there is a loss of superficial pain with pinprick, but patient is aware of being touched   9.   Best Language 0-->No aphasia, normal   10. Dysarthria 0-->Normal   11. Extinction and Inattention  0-->No abnormality   Total 2 (03/04/20 1817)       Imaging  I personally reviewed all imaging; relevant findings per HPI.     Lab Results Data   CBC  Recent Labs   Lab 03/04/20  1136   WBC 9.4   RBC 4.19   HGB 13.4   HCT 40.2        Basic Metabolic Panel    Recent Labs   Lab 03/04/20  1136      POTASSIUM 3.4   CHLORIDE 111*   CO2 23   BUN 6*   CR 0.70   *   KAILEE 9.2     Liver  Panel  Recent Labs   Lab Test 03/04/20  1136   PROTTOTAL 7.4   ALBUMIN 4.0   BILITOTAL 1.1   ALKPHOS 59   AST 13   ALT 17     INR  Recent Labs   Lab Test 03/04/20  1136   INR 1.05      Lipid ProfileNo lab results found.  A1C  Recent Labs   Lab Test 03/04/20  1136   A1C 5.2     Troponin I  Recent Labs   Lab 03/04/20  1427 03/04/20  1136   TROPI <0.015 <0.015          Stroke Code / Stroke Consult Data Data   Stroke Code Data  (for stroke code without tele)  Stroke code activated 03/04/20   1137   First stroke provider response 03/04/20   1140   Last known normal 03/04/20   1025   Time of discovery   (or onset of symptoms) 03/04/20   1030   Head CT read by me 03/04/20   1147   Was stroke code de-escalated? Yes 03/04/20 1215            I have personally spent a total of 40 minutes providing critical care services supervising this patient's stroke code activation.  I personally reviewed all lab values and radiology images.  I evaluated and directed care for the patient's critical condition.

## 2020-03-05 NOTE — PLAN OF CARE
Discharge Planner PT   Patient plan for discharge: home with spouse  Current status: PT orders received, per discussion with OT pt is IND for all mobility including stairs.  No PT needs identified.  Barriers to return to prior living situation: none  Recommendations for discharge: defer to OT  Rationale for recommendations: defer to OT       Entered by: Vaishali Aponte 03/05/2020 10:19 AM

## 2020-03-05 NOTE — PROGRESS NOTES
"  [facility]    Stroke Progress Note    Interval Events  Patient is asymptomatic today. Reports that her symptoms lasted intermittently yesterday until about 6pm and then she had some headache in the evening.    121/74 was her BP on arrival to the ED.    TIA Evaluation Summarized  MRI and/or Head CT: MRI brain shows no stroke  Intracranial Vascular Imaging: CTA Head shows no evidence of large vessel occlusion or high-grade stenosis  Cervical Carotid and Vertebral Artery Vascular Imaging: CTA neck unremarkable  Echocardiogram: EF 55%, +PFO  EKG/Telemetry: SR  LDL: 113  A1c: 5.2  Other testing: Not Applicable    ABCD2 Patients Score   Age ? 60 years 1 point 0   Blood Pressure     -SBP ? 140 or DBP ?  90    1 point 0   Clinical Features    -Unilateral weakness   -Speech disturbance w/o weakness    -Other    2 points  1 point    0 points 2   Duration of symptoms    ?60 minutes    10-59 minutes    <10 minutes   2 points  1 point  0 points 2   Diabetes  1 point 0   Patient s ABCD2 Score (0-7) = 4       Impression  Transient Ischemic Attack, less likely migraine    Plan  - Continue Plavix 75 mg and aspirin 81mg daily together for 21 days; then aspirin 325mg alone  - Agree with lipitor 40mg daily for goal LDL 40-70  - Long term tight BP control for goal <135/80  - No follow up needed for PFO as patient did not have stroke    Follow up with PCP within 1 week and PAUL Davalos within 4-6 weeks    Raya Verduzco PA-C  Neurology  03/05/2020 2:53 PM  To page me or covering stroke neurology team member, click here: AMCOM  Choose \"On Call\" tab at top, then search dropdown box for \"Neurology Adult\" & press Enter, look for Neuro ICU/Stroke    ______________________________________________________    Medications   Home Meds  Prior to Admission medications    Medication Sig Start Date End Date Taking? Authorizing Provider   methylPREDNISolone Acetate (DEPO-MEDROL IJ) Inject as directed every 3 months   Yes Unknown, " Entered By History       Scheduled Meds    [START ON 3/6/2020] aspirin  81 mg Oral Daily     atorvastatin  40 mg Oral or NG Tube Daily at 8 pm     clopidogrel  75 mg Oral or NG Tube Daily     sodium chloride (PF)  3 mL Intracatheter Q8H       Infusion Meds    - MEDICATION INSTRUCTIONS -       sodium chloride 100 mL/hr at 03/04/20 2200       PRN Meds  acetaminophen, acetaminophen, hydrALAZINE, labetalol, lidocaine 4%, lidocaine (buffered or not buffered), - MEDICATION INSTRUCTIONS -, melatonin, naloxone, ondansetron **OR** ondansetron, polyethylene glycol, prochlorperazine **OR** prochlorperazine **OR** prochlorperazine, senna-docusate **OR** senna-docusate, sodium chloride (PF)       PHYSICAL EXAMINATION  Temp:  [98  F (36.7  C)-98.7  F (37.1  C)] 98.5  F (36.9  C)  Heart Rate:  [60-86] 67  Resp:  [16] 16  BP: (108-130)/(53-80) 125/74  SpO2:  [95 %-97 %] 97 %     General:  patient lying in bed without any acute distress    HEENT:  normocephalic/atraumatic  Pulmonary:  no respiratory distress    Neurologic  Mental Status:  alert, oriented x 3, follows commands, speech clear and fluent, naming and repetition normal  Cranial Nerves:  visual fields intact, PERRL, EOMI with normal smooth pursuit, facial sensation intact and symmetric, facial movements symmetric, hearing not formally tested but intact to conversation, palate elevation symmetric and uvula midline, no dysarthria, shoulder shrug strong bilaterally, tongue protrusion midline  Motor:  normal muscle tone and bulk, no abnormal movements, able to move all limbs spontaneously, strength 5/5 throughout upper and lower extremities, no pronator drift  Reflexes:  toes down-going  Sensory:  light touch sensation intact and symmetric throughout upper and lower extremities, no extinction on double simultaneous stimulation   Coordination:  normal finger-to-nose and heel-to-shin bilaterally without dysmetria, rapid alternating movements symmetric  Station/Gait:  deferred      Stroke Scales    NIHSS  Interval discharge (03/05/20 1400)   Interval Comments     1a. Level of Consciousness 0-->Alert, keenly responsive   1b. LOC Questions 0-->Answers both questions correctly   1c. LOC Commands 0-->Performs both tasks correctly   2.   Best Gaze 0-->Normal   3.   Visual 0-->No visual loss   4.   Facial Palsy 0-->Normal symmetrical movements   5a. Motor Arm, Left 0-->No drift, limb holds 90 (or 45) degrees for full 10 secs   5b. Motor Arm, Right 0-->No drift, limb holds 90 (or 45) degrees for full 10 secs   6a. Motor Leg, Left 0-->No drift, leg holds 30 degree position for full 5 secs   6b. Motor Leg, right 0-->No drift, leg holds 30 degree position for full 5 secs   7.   Limb Ataxia 0-->Absent   8.   Sensory 0-->Normal, no sensory loss   9.   Best Language 0-->No aphasia, normal   10. Dysarthria 0-->Normal   11. Extinction and Inattention  0-->No abnormality   Total 0 (03/05/20 1400)       Imaging  I personally reviewed all imaging; relevant findings per HPI.     Lab Results Data   CBC  Recent Labs   Lab 03/05/20  0850 03/04/20  1136   WBC 8.6 9.4   RBC 4.07 4.19   HGB 12.8 13.4   HCT 39.4 40.2    237     Basic Metabolic Panel    Recent Labs   Lab 03/05/20  0850 03/04/20  1136    140   POTASSIUM 4.1 3.4   CHLORIDE 116* 111*   CO2 21 23   BUN 7 6*   CR 0.72 0.70   GLC 89 107*   KAILEE 8.7 9.2     Liver Panel  Recent Labs   Lab Test 03/04/20  1136   PROTTOTAL 7.4   ALBUMIN 4.0   BILITOTAL 1.1   ALKPHOS 59   AST 13   ALT 17     INR  Recent Labs   Lab Test 03/04/20  1136   INR 1.05      Lipid Profile  Recent Labs   Lab Test 03/05/20  0850   CHOL 163   HDL 37*   *   TRIG 67     A1C  Recent Labs   Lab Test 03/04/20  1136   A1C 5.2     Troponin I  Recent Labs   Lab 03/04/20  2219 03/04/20  1427 03/04/20  1136   TROPI <0.015 <0.015 <0.015

## 2020-03-05 NOTE — PLAN OF CARE
Pt here with stroke-like symptoms. A&OX4. Neuros intact ex intermittent L extremity weakness (4/5) and L pronator drift. VSS on R/A. Tele NSR. Regular diet, thin liquids. Takes pills whole with water. Up independently. C/o pain moderate headache, managed with tylenol. Pt scoring green on the Aggression Stop Light Tool. Plan for ECHO today. Discharge pending.

## 2020-03-05 NOTE — DISCHARGE SUMMARY
[facility]  Hospitalist Discharge Summary       Date of Admission:  3/4/2020  Date of Discharge:  3/5/2020  Discharging Provider: Walt Ayala MD      Discharge Diagnoses   TIA  Tobacco abuse    Follow-ups Needed After Discharge   Follow-up Appointments     Follow-up and recommended labs and tests       Follow up with primary care provider, [facility],   within 7 days for hospital follow- up.  No follow up labs or test are   needed.  Follow up with [facility] in [place] within 4-6   weeks.           Discharge Disposition   Discharged to home  Condition at discharge: Stable    Hospital Course   America Hartley is a 44 year old female admitted on 3/4/2020.  Past medical history of nephrolithiasis and tobacco abuse who presents with dysarthria, left facial droop and left upper and lower extremity numbness.    TIA  Presents with acute onset dysarthria, left facial droop and left upper and lower extremity numbness.  Admission CT/CTA/CTP all negative for acute pathology.  MRI brain negative for CVA but showed small nonspecific lesion of left frontal lobe.  EKG without ischemic changes, serial trop negative.  TTE with normal EF but moderately positive for PFO.  No headache and without history of migraine.  Symptoms improved by time of arrival to ED and have resolved by time of discharge.  Neurology was consulted, suspect TIA.  Recommend aspirin 81 mg and Plavix 75 mg daily for 21 days, at which time Plavix should be discontinued and aspirin increased to 325 mg daily.  Follow up with PCP within 1 week and with [facility] in 4-6 weeks.  She was also initiated on atorvastatin for LDL of 113.  A1C was wnl.  No follow up with therapies required.     Tobacco abuse  Smokes 1/2 pack/day and has done so for the past 30 years.  She declines any nicotine patch.      Consultations This Hospital Stay   NEUROLOGY IP CONSULT  PHYSICAL THERAPY ADULT IP  CONSULT  OCCUPATIONAL THERAPY ADULT IP CONSULT  SPEECH LANGUAGE PATH ADULT IP CONSULT  SWALLOW EVAL SPEECH PATH AT BEDSIDE IP CONSULT  SMOKING CESSATION PROGRAM IP CONSULT  PATIENT LEARNING CENTER IP CONSULT    Code Status   Full Code    Time Spent on this Encounter   I, Walt Ayala MD, personally saw the patient today and spent less than or equal to 30 minutes discharging this patient.       Walt Ayala MD  [facility]  ______________________________________________________________________    Physical Exam   Vital Signs: Temp: 98.5  F (36.9  C) Temp src: Oral BP: 125/74   Heart Rate: 67 Resp: 16 SpO2: 97 % O2 Device: None (Room air)    Weight: 185 lbs 6.51 oz  General Appearance: Well nourished female in NAD  Respiratory: lungs CTAB, no wheezes or crackles, no tachypnea  Cardiovascular: RRR, normal s1/s2 without murmur  GI: abdomen soft, normal bowel sounds  Other: Alert and appropriate, cranial nerves grossly intact        Primary Care Physician   [facility]    Discharge Orders      Reason for your hospital stay    You were admitted for transient ischemic attack (TIA) which has resolved.     Follow-up and recommended labs and tests     Follow up with primary care provider, [facility], within 7 days for hospital follow- up.  No follow up labs or test are needed.  Follow up with [facility] in [place] within 4-6 weeks.     Activity    Your activity upon discharge: activity as tolerated     Discharge Instructions    Take aspirin 81 mg daily and Plavix 75 mg daily for 20 days.  After 20 days, stop Plavix and increase aspirin to 325 mg daily indefinitely thereafter.     Full Code     Diet    Follow this diet upon discharge:       Regular Diet Adult       Significant Results and Procedures   Most Recent 3 CBC's:  Recent Labs   Lab Test 03/05/20  0850 03/04/20  1136   WBC 8.6 9.4   HGB 12.8 13.4   MCV 97 96    237     Most Recent  3 BMP's:  Recent Labs   Lab Test 03/05/20  0850 03/04/20  1136    140   POTASSIUM 4.1 3.4   CHLORIDE 116* 111*   CO2 21 23   BUN 7 6*   CR 0.72 0.70   ANIONGAP 3 6   KAILEE 8.7 9.2   GLC 89 107*     Most Recent 2 LFT's:  Recent Labs   Lab Test 03/04/20  1136   AST 13   ALT 17   ALKPHOS 59   BILITOTAL 1.1     Most Recent 3 Troponin's:  Recent Labs   Lab Test 03/04/20  2219 03/04/20  1427 03/04/20  1136   TROPI <0.015 <0.015 <0.015     Most Recent Cholesterol Panel:  Recent Labs   Lab Test 03/05/20  0850   CHOL 163   *   HDL 37*   TRIG 67     Most Recent Hemoglobin A1c:  Recent Labs   Lab Test 03/04/20  1136   A1C 5.2   ,   Results for orders placed or performed during the hospital encounter of 03/04/20   CT Head w/o Contrast    Narrative    CT SCAN OF THE HEAD WITHOUT CONTRAST   3/4/2020 11:47 AM     HISTORY: Code stroke.    TECHNIQUE: Axial images of the head and coronal reformations without  IV contrast material. Radiation dose for this scan was reduced using  automated exposure control, adjustment of the mA and/or kV according  to patient size, or iterative reconstruction technique.    COMPARISON: None.    FINDINGS: Mild volume loss is present. White matter hypoattenuation  likely represents mild chronic small vessel ischemic change. The  cerebral hemispheres, brainstem, and cerebellum otherwise demonstrate  normal morphology and attenuation. No evidence of acute ischemia,  hemorrhage, mass, mass effect or hydrocephalus. The visualized  calvarium, tympanic cavities, mastoid cavities, and extracranial soft  are unremarkable. Moderate right frontal sinus mucosal thickening is  present with aerated secretions.      Impression    IMPRESSION: No evidence of acute ischemia or hemorrhage (ASPECTS 10).    MIRTHA MAKI MD   CT Head Perfusion w Contrast    Narrative    CT BRAIN PERFUSION 3/4/2020 12:06 PM    HISTORY: Stroke code.    TECHNIQUE: Time sequential axial CT images of the head were acquired  during  the administration of 50 mL Isovue-370 IV. Color perfusion maps  of the brain were created from this time sequential axial source data.      Radiation dose for this scan was reduced using automated exposure  control, adjustment of the mA and/or kV according to patient size, or  iterative reconstruction technique.    COMPARISON: None.    FINDINGS: Cerebral blood flow, transit time, and volume maps  demonstrate symmetric perfusion bilaterally without evidence of focal  perfusion defect.      Impression    IMPRESSION: Normal perfusion CT scan of the brain.    MIRTHA MAKI MD   CTA Head Neck with Contrast    Narrative    CT ANGIOGRAM OF THE HEAD AND NECK WITH CONTRAST  3/4/2020 11:58 AM     HISTORY: Code stroke     TECHNIQUE: CT angiography with an injection of 70 mL Isovue-370 IV  with scans through the head and neck. Images were transferred to a  separate 3-D workstation where multiplanar reformations and 3-D images  were created. Estimates of carotid stenoses are made relative to the  distal internal carotid artery diameters except as noted. Radiation  dose for this scan was reduced using automated exposure control,  adjustment of the mA and/or kV according to patient size, or iterative  reconstruction technique.    COMPARISON: None.     CT ANGIOGRAM HEAD FINDINGS: The vertebral arteries, basilar artery,  and posterior cerebral arteries are patent. Vertebrobasilar system is  small with fetal origin of the bilateral posterior cerebral arteries.  The internal carotid arteries, anterior cerebral arteries, and middle  cerebral arteries are patent. The bilateral middle cerebral artery M1  segments appear slightly small in caliber. No evidence of large vessel  occlusion. No evidence of aneurysm or vascular malformation. Dural  venous sinuses are unremarkable.     CT ANGIOGRAM NECK FINDINGS: A four vessel aortic arch is present with  origin of the left vertebral artery directly from the aortic arch. The  bilateral common  carotid, internal carotid, external carotid, and  vertebral arteries are patent. No evidence of dissection, occlusion,  or flow-limiting stenosis. The thyroid gland is mildly enlarged and  heterogeneous in attenuation. [place] tonsillar tissue appears mildly  enlarged, likely reactive.       Impression    IMPRESSION:   CTA Head:   1. No evidence of large vessel occlusion or high-grade stenosis.  CTA Neck:   1. No evidence of dissection or flow-limiting stenosis.     MIRTHA MAKI MD   MRI Brain w & w/o contrast    Narrative    MRI BRAIN WITHOUT AND WITH CONTRAST  3/4/2020 9:40 PM    HISTORY:  Focal neuro deficit, < 6 hrs, stroke suspected.     TECHNIQUE:  Multiplanar, multisequence MRI of the brain without and  with 8mL Gadavist.    COMPARISON: None.    FINDINGS: Diffusion-weighted images are normal. There is no evidence  for intracranial hemorrhage or acute infarct. There is a solitary 0.8  x 0.5 cm signal hyperintensity in the left frontal white matter  without enhancement or mass effect. Brain parenchyma is otherwise  normal. Vascular structures are patent at the skull base. Moderate  mucosal thickening is noted in the right frontal sinus. Postcontrast  images do not show any abnormal areas of enhancement or focal mass  lesions.      Impression    IMPRESSION:  1. No evidence for intracranial hemorrhage, acute infarct, or any  focal mass lesions.  2. Small nonspecific nonenhancing white matter lesion left frontal  lobe. This is nonspecific and could be due to a small area of gliosis,  chronic ischemic change, or chronic demyelination.  3. Moderate mucosal thickening in right frontal sinus.    MASSIMO GOODWIN MD   Echocardiogram Complete - Bubble study    Narrative    [id number]  [id number]  [id number]  [id number]^ALVIN^HELDER           [facility]  Echocardiography Laboratory  [address] [place] [address]        Name: AALIYAH CARUSO  MRN: [id number]  : 1975  Study Date: 2020 08:24  AM  Age: 44 yrs  Gender: Female  Patient Location: [facility]  Reason For Study: CVA  Ordering Physician: HELDER ESQUIVEL  Referring Physician: [facility]  Performed By: Saloni Zamora     BSA: 1.9 m2  Height: 66 in  Weight: 185 lb  HR: 65  BP: 125/53 mmHg  _____________________________________________________________________________  __        Procedure  Complete Portable Bubble Echo Adult.  _____________________________________________________________________________  __        Interpretation Summary     The visual ejection fraction is estimated at 55%.  Bubble study was positive for R to L interatrial shunting.  No hemodynamically significant valve disease.  No prior studies for comparison.  _____________________________________________________________________________  __        Left Ventricle  The left ventricle is normal in structure, function and size. Diastolic  Doppler findings (E/E' ratio and/or other parameters) suggest left ventricular  filling pressures are normal. The visual ejection fraction is estimated at  55%.     Right Ventricle  The right ventricle is normal in size and function.     Atria  Normal left atrial size. Right atrial size is normal. A contrast injection  (Bubble Study) was performed that was moderately positive for flow across the  interatrial septum.     Mitral Valve  The mitral valve is normal in structure and function.        Tricuspid Valve  The tricuspid valve is normal in structure and function. There is trace  tricuspid regurgitation. The right ventricular systolic pressure is  approximated at 17.4 mmHg plus the right atrial pressure.     Aortic Valve  Aortic valve was not well seen on short axis, but very likely it is  trileaflet. There is physiologic aortic regurgitation. No hemodynamically  significant valvular aortic stenosis.     Pulmonic Valve  The pulmonic valve is not well visualized.     Vessels  The aortic root is normal size. Dilation of the inferior vena  cava is present  with normal respiratory variation in diameter. IVC diameter and respiratory  changes fall into an intermediate range suggesting an RA pressure of 8 mmHg.     Pericardium  There is no pericardial effusion.     _____________________________________________________________________________  __  MMode/2D Measurements & Calculations  IVSd: 1.00 cm  LVIDd: 5.4 cm  LVIDs: 3.6 cm  LVPWd: 0.94 cm  FS: 34.0 %  LV mass(C)d: 197.7 grams  LV mass(C)dI: 102.2 grams/m2     Ao root diam: 2.9 cm  LA dimension: 3.5 cm  asc Aorta Diam: 3.2 cm  LA/Ao: 1.2  LVOT diam: 2.3 cm  LVOT area: 4.0 cm2  LA Volume (BP): 47.9 ml  LA Volume Index (BP): 24.8 ml/m2  RWT: 0.35        Doppler Measurements & Calculations  MV E max eliseo: 72.7 cm/sec  MV A max eliseo: 60.3 cm/sec  MV E/A: 1.2  MV dec time: 0.20 sec     PA V2 max: 82.0 cm/sec  PA max P.7 mmHg  PA acc time: 0.14 sec  TR max eliseo: 208.7 cm/sec  TR max P.4 mmHg  E/E' av.9  Lateral E/e': 5.1  Medial E/e': 6.7           _____________________________________________________________________________  __           Report approved by: Lydia Umaña 2020 09:37 AM            Discharge Medications   Current Discharge Medication List      START taking these medications    Details   aspirin (ASA) 81 MG EC tablet Take 1 tablet (81 mg) by mouth daily  Qty: 20 tablet, Refills: 0    Comments: Future refills by PCP [facility] with phone number [phone].  Associated Diagnoses: Acute CVA (cerebrovascular accident) (H)      atorvastatin (LIPITOR) 40 MG tablet 1 tablet (40 mg) by Oral or NG Tube route daily  Qty: 30 tablet, Refills: 0    Comments: Future refills by PCP [facility] with phone number [phone].  Associated Diagnoses: Acute CVA (cerebrovascular accident) (H)      clopidogrel (PLAVIX) 75 MG tablet 1 tablet (75 mg) by Oral or NG Tube route daily  Qty: 20 tablet, Refills: 0    Comments: Future refills by PCP   [facility] with phone number [phone].  Associated Diagnoses: Acute CVA (cerebrovascular accident) (H)         CONTINUE these medications which have NOT CHANGED    Details   methylPREDNISolone Acetate (DEPO-MEDROL IJ) Inject as directed every 3 months           Allergies   No Known Allergies

## 2020-03-05 NOTE — PLAN OF CARE
Discharge Planner SLP   Patient plan for discharge: Did not meet with pt  Current status: SLP: ST orders received. Chart reviewed and discussed with RN. No SLP needs identified at this time. Will complete orders.  Barriers to return to prior living situation: None  Recommendations for discharge: No IP SLP needs  Rationale for recommendations: No IP SLP needs       Entered by: Shelli Carvajal 03/05/2020 9:05 AM

## 2020-03-05 NOTE — UTILIZATION REVIEW
"  Admission Status; Secondary Review Determination     Admission Date: 3/4/2020 11:33 AM      Under the authority of the Utilization Management Committee, the utilization review process indicated a secondary review on the above patient.  The review outcome is based on review of the medical records, discussions with staff, and applying clinical experience noted on the date of the review.        ()      Inpatient Status Appropriate - This patient's medical care is consistent with medical management for inpatient care and reasonable inpatient medical practice.      (x) Observation Status Appropriate - This patient does not meet hospital inpatient criteria and is placed in observation status. If this patient's primary payer is Medicare and was admitted as an inpatient, Condition Code 44 should be used and patient status changed to \"observation\".   () Admission Status NOT Appropriate - This patient's medical care is not consistent with medical management for Inpatient or Observation Status.          RATIONALE FOR DETERMINATION   Patient is a 44-year-old female with a past medical history significant for nephrolithiasis and tobacco use disorder.  She presented to the hospital with dysarthria and left-sided extremity numbness.  She initially was placed in the hospital on inpatient status for possible stroke.  Imaging with MRI of the brain does not show acute intracranial pathology.  Symptoms have completely resolved.  It is thought that she may have had a TIA versus a migraine headache.  She has been seen in consultation by stroke neurology.  She should be able to discharge from the hospital soon.  Her hospital care to this point is consistent with observation level care at the hospital.      The severity of illness, intensity of service provided, expected LOS and risk for adverse outcome make the care appropriate for observation level care at the hospital.        The information on this document is developed by the " utilization review team in order for the business office to ensure compliance.  This only denotes the appropriateness of proper admission status and does not reflect the quality of care rendered.         The definitions of Inpatient Status and Observation Status used in making the determination above are those provided in the CMS Coverage Manual, Chapter 1 and Chapter 6, section 70.4.      Sincerely,     Semaj Saenz D.O.  Utilization Review/ Case Management  [facility].

## 2020-03-05 NOTE — DISCHARGE INSTRUCTIONS
Please follow up with neurology in 4-6 weeks. You may call any of the following neurology clinics for an appointment or a clinic of your choice. Tell them you were recently hospitalized and need follow up as recommended at discharge.    1. [facility]   [address], [address]   [place] [address]   [phone]  2. [facility]   [address], [address]   [place] [address]   [phone]  3. [facility]   Gavin Cobb MD   [address] [place], [place] [id number] [phone]  4. [facility]   Gavin Cobb MD   [address] [place] [address] [phone]          Your risk factors for stroke or TIA (transient ischemic attack):    Your Risk Factors Your Results Normal Ranges   High blood pressure BP Readings from Last 1 Encounters:   03/05/20 125/74    Less than 120/80   Cholesterol              Total Lab Results   Component Value Date    CHOL 163 03/05/2020      Less than 150    Triglycerides   Lab Results   Component Value Date    TRIG 67 03/05/2020    Less than 150   LDL Lab Results   Component Value Date     03/05/2020       Less than 70   HDL Lab Results   Component Value Date    HDL 37 03/05/2020            Greater than 40 (men)  Greater than 50 (women)   Diabetes Recent Labs   Lab 03/05/20  0850   GLC 89    Fasting blood glucose    Smoking/tobacco use  Quit smoking and tobacco   Overweight  Lose 1-2 pounds a week   Lack of exercise  30 minutes moderate activity each day   Other risk factors include carotid (neck) artery disease, atrial fibrillation and stress. You may be on new medicine to treat high blood pressure, cholesterol, diabetes or atrial fibrillation.    Understanding Stroke Booklet given to patient. Please refer to booklet for further information.    Stroke warning signs and symptoms - CALL 911 right away for:  - Sudden numbness or weakness in the face, arm or leg (often  on one side of the body).  - Sudden confusion or trouble understanding what is going on.  - Sudden blurred or decreased vision in one or both eyes.  - Sudden trouble speaking, loss of balance, dizziness or problems with coordination.  - Sudden, severe headache for no reason.  - Fainting or seizures.  - Symptoms may go away then come back suddenly.

## 2020-03-05 NOTE — PLAN OF CARE
DATE & TIME: 3/4/2020 4551-6133    Cognitive Concerns/ Orientation : A+Ox4   BEHAVIOR & AGGRESSION TOOL COLOR: Green. Calm and Cooperative  CIWA SCORE: NA   ABNL VS/O2: NA  MOBILITY: Up ad pablo  PAIN MANAGMENT: Denies  DIET: Reg  BOWEL/BLADDER: WDL  ABNL LAB/BG: None  DRAIN/DEVICES: None  TELEMETRY RHYTHM: NA  SKIN: WDL  TESTS/PROCEDURES: MRI now  D/C DAY/GOALS/PLACE: TBD  OTHER IMPORTANT INFO: TBD

## 2020-03-05 NOTE — PLAN OF CARE
Discharge Planner OT   Patient plan for discharge: Home with spouse  Current status: Pt is completing functional mobility, a flight of stairs, and self-cares independently. She demonstrates good dynamic standing balance. Mild LUE gross grasp impairment, LUE coordination is WFL. Pt was issued gross grasp HEP. Scored 24/30 on [employer] Cognitive Assessment (MOCA) version 7.1. Normal is 26 or higher. Pt reports her cognition is likely near baseline. Pt and her spouse educated regarding what to do if she notices cognitive decline or visual-perceptual deficits at discharge. No PT services indicated  Barriers to return to prior living situation: none  Recommendations for discharge: home  Rationale for recommendations: Pt is completing self-cares at or very near baseline. Recommend initial supervision with driving. Recommend she contact her PCP if she notices cognitive or visual-perceptual changes at discharge to setup OP OT.        Entered by: Lizet Davies 03/05/2020 9:55 AM       Occupational Therapy Discharge Summary    Reason for therapy discharge:    Discharged to home.    Progress towards therapy goal(s). See goals on Care Plan in [facility] electronic health record for goal details.  Goals met    Therapy recommendation(s):    No further therapy is recommended. Continue home exercise program

## 2020-03-05 NOTE — PROGRESS NOTES
"   03/05/20 1000   General Information   Patient is receptive to smoking cessation at this time Yes   Packs Per Day 0.5 PPD   Years Smoked (#)   (\"a lot\")   Stage of Behavior Change   Patient's Stage of Behavior Change Contemplation \"I might (within the next 60 days\"   Processes of Change   The following interventions were used (smoking cessation) Increase awareness of effects of smoking on health;Increase awareness of how smoking affects others;Develop strategies to increase confidence to quit;Initiate positive self-evaluation as smoke free;Problem-solve barriers to quitting;Develop action plan;Identify healthy alternatives;Identify support system;Recognize rewards of value to him/her;Initiate pro/con list of reasons to quit;Accept responsibility for effects of smoking   Motivation to Quit Scale (1-10) 10   Confidence to Quit Scale (1-10) 5   Education/Recommendations   Education QUIT PLAN phone line;[place] [employer]uit [employer] phone follow up program   Treatment Time (Minutes) 12 minutes   Total Evaluation Time   Total Evaluation Time (Minutes) 12     "

## 2020-03-05 NOTE — PLAN OF CARE
Pt here possible stroke, but MRI negative for infarct. A&Ox4. Neuros intact. VSS. Tele NSR. Regular diet, thin liquids. Takes pills whole. Voiding adequately. Up Ind. Denies pain. Pt scoring green on the Aggression Stop Light Tool. Discharge to home w/ spouse this afternoon.